# Patient Record
Sex: MALE | Employment: FULL TIME | URBAN - METROPOLITAN AREA
[De-identification: names, ages, dates, MRNs, and addresses within clinical notes are randomized per-mention and may not be internally consistent; named-entity substitution may affect disease eponyms.]

---

## 2024-02-02 NOTE — PROGRESS NOTES
[facility]  [address]   [address]  [place] [address]  [phone]    Keith Andres  : 1995    CC: Discharge in urine referral          HPI     Keith Andres is a 28 y.o.  male who was referred for urethral discharge in for a year with intermittent pelvic pain for 3 years. Per PCP it is reported the urine is cloudy and when he finishes the stream he has a pinching sensation in his pelvic floor followed by the release of \"stringy white discharge\". Longest without symptoms is 3 days.     Today he reports the above symptoms that come/go about every 3 days.  Sexual intercourse / ejaculation seem to help symptoms while abstinence makes them worse.  Does have urinary hesitancy, slow stream, urgency, frequency.  States pain is primarily in perineum / pelvic floor.  Denies hematuria, hematospermia, dysuria, UTIs, erectile dysfunction.  No history of  procedures.  No history of  trauma.  No history of STIs.  Reports he has seen multiple urologists for this issue and diagnosed with chronic prostatitis.  He has never had a cystoscopy and has never been on medication for chronic prostatitis.  He has not had any pelvic imaging. He was told to \"ejaculate frequently\" to relieve symptoms.      ON review, he also describes L testicle pain with the urethral discharge episodes that also subsides.  Denies twisting of testicle.  Has not had any scrotal imaging.  Denies palpable mass in testicle.  No personal or FH of  cancer.  Does state that dad had similar prostate issues.    PVR 24 cc     UA was normal today.     Past Medical History:   Diagnosis Date    Hypertension        History reviewed. No pertinent surgical history.    Current Outpatient Medications   Medication Sig Dispense Refill    lisinopril (PRINIVIL;ZESTRIL) 10 MG tablet Take 1 tablet by mouth daily      omeprazole (PRILOSEC) 40 MG delayed release capsule Take 1 capsule by mouth daily       No current facility-administered

## 2024-02-05 ENCOUNTER — OFFICE VISIT (OUTPATIENT)
Dept: UROLOGY | Age: 29
End: 2024-02-05
Payer: COMMERCIAL

## 2024-02-05 DIAGNOSIS — R39.198 OTHER DIFFICULTIES WITH MICTURITION: ICD-10-CM

## 2024-02-05 DIAGNOSIS — R36.9 DISCHARGE FROM URETHRA IN MALE: Primary | ICD-10-CM

## 2024-02-05 DIAGNOSIS — N50.812 PAIN IN LEFT TESTICLE: ICD-10-CM

## 2024-02-05 LAB
BILIRUBIN, URINE, POC: NEGATIVE
BLOOD URINE, POC: NEGATIVE
GLUCOSE URINE, POC: NEGATIVE
KETONES, URINE, POC: NEGATIVE
LEUKOCYTE ESTERASE, URINE, POC: NEGATIVE
NITRITE, URINE, POC: NEGATIVE
PH, URINE, POC: 7.5 (ref 4.6–8)
PROTEIN,URINE, POC: NORMAL
PVR, POC: 24 CC
SPECIFIC GRAVITY, URINE, POC: 1.02 (ref 1–1.03)
URINALYSIS CLARITY, POC: NORMAL
URINALYSIS COLOR, POC: NORMAL
UROBILINOGEN, POC: NORMAL

## 2024-02-05 PROCEDURE — 81003 URINALYSIS AUTO W/O SCOPE: CPT | Performed by: UROLOGY

## 2024-02-05 PROCEDURE — 51798 US URINE CAPACITY MEASURE: CPT | Performed by: UROLOGY

## 2024-02-05 PROCEDURE — 99205 OFFICE O/P NEW HI 60 MIN: CPT | Performed by: UROLOGY

## 2024-02-05 RX ORDER — LISINOPRIL 10 MG/1
10 TABLET ORAL DAILY
COMMUNITY
Start: 2022-10-17 | End: 2024-09-12

## 2024-02-05 RX ORDER — OMEPRAZOLE 40 MG/1
40 CAPSULE, DELAYED RELEASE ORAL DAILY
COMMUNITY
Start: 2021-02-26

## 2024-02-06 ASSESSMENT — ENCOUNTER SYMPTOMS
EYE DISCHARGE: 0
SHORTNESS OF BREATH: 0
BLOOD IN STOOL: 0
SKIN LESIONS: 0
COUGH: 0
DIARRHEA: 0
INDIGESTION: 0
ABDOMINAL PAIN: 0
VOMITING: 0
HEARTBURN: 0
CONSTIPATION: 0
BACK PAIN: 0
EYE PAIN: 0
WHEEZING: 0
NAUSEA: 0

## 2024-02-12 ENCOUNTER — HOSPITAL ENCOUNTER (OUTPATIENT)
Dept: ULTRASOUND IMAGING | Age: 29
Discharge: HOME | End: 2024-02-15
Attending: UROLOGY

## 2024-02-12 DIAGNOSIS — N50.812 PAIN IN LEFT TESTICLE: ICD-10-CM

## 2024-02-13 NOTE — RESULT ENCOUNTER NOTE
Mr. Andres, your scrotal ultrasound does not show a mass or any overly concerning finding to explain your testicle pain.  It does show some small varicoceles / hydroceles and benign epididymal cysts.  These are usually nothing to worry about and I will review these with you at your upcoming appointment.     Please let me know if you have questions / concerns in the interim.     Best regards,  Dr. Morris

## 2024-03-08 ENCOUNTER — TELEPHONE (OUTPATIENT)
Dept: UROLOGY | Age: 29
End: 2024-03-08

## 2024-03-08 NOTE — TELEPHONE ENCOUNTER
Called [employer] to get update on the Pending PA (have to do in Portal) that had the decision of \"VOIDED\", representative stated that the insurance had ended on 02/29/24. I did go into pt chart where ins still says \"e-verified\" and updated it to be ineffective.     I called the pt - no answer, left vm to call in with new insurance.

## 2024-03-11 ENCOUNTER — PROCEDURE VISIT (OUTPATIENT)
Dept: UROLOGY | Age: 29
End: 2024-03-11

## 2024-03-11 DIAGNOSIS — N48.89 PENILE PAIN: ICD-10-CM

## 2024-03-11 DIAGNOSIS — N32.81 OVERACTIVE BLADDER: ICD-10-CM

## 2024-03-11 DIAGNOSIS — N41.1 CHRONIC PROSTATITIS: ICD-10-CM

## 2024-03-11 DIAGNOSIS — R36.9 DISCHARGE FROM URETHRA IN MALE: Primary | ICD-10-CM

## 2024-03-11 LAB
BILIRUBIN, URINE, POC: NORMAL
BLOOD URINE, POC: NEGATIVE
GLUCOSE URINE, POC: NEGATIVE
KETONES, URINE, POC: NEGATIVE
LEUKOCYTE ESTERASE, URINE, POC: NEGATIVE
NITRITE, URINE, POC: NEGATIVE
PH, URINE, POC: 6 (ref 4.6–8)
PROTEIN,URINE, POC: NEGATIVE
SPECIFIC GRAVITY, URINE, POC: 1.02 (ref 1–1.03)
URINALYSIS CLARITY, POC: NORMAL
URINALYSIS COLOR, POC: NORMAL
UROBILINOGEN, POC: NORMAL

## 2024-03-11 PROCEDURE — 99214 OFFICE O/P EST MOD 30 MIN: CPT | Performed by: UROLOGY

## 2024-03-11 PROCEDURE — 81003 URINALYSIS AUTO W/O SCOPE: CPT | Performed by: UROLOGY

## 2024-03-11 PROCEDURE — 52000 CYSTOURETHROSCOPY: CPT | Performed by: UROLOGY

## 2024-03-11 NOTE — PROGRESS NOTES
[facility]  [address]   [address]  [place] [address]  [phone]    Keith Andres  : 1995    HPI   28 y.o.  male who presents for cystoscopy for evaluation of urethral pain.     Referred for urethral discharge in for a year with intermittent pelvic pain for 3 years. Per PCP it is reported the urine is cloudy and when he finishes the stream he has a pinching sensation in his pelvic floor followed by the release of \"stringy white discharge\". Longest without symptoms is 3 days.      Today he reports the above symptoms that come/go about every 3 days.  Sexual intercourse / ejaculation seem to help symptoms while abstinence makes them worse.  Does have urinary hesitancy, slow stream, urgency, frequency.  States pain is primarily in perineum / pelvic floor.  Denies hematuria, hematospermia, dysuria, UTIs, erectile dysfunction.  No history of  procedures.  No history of  trauma.  No history of STIs.  Reports he has seen multiple urologists for this issue and diagnosed with chronic prostatitis.  He has never had a cystoscopy and has never been on medication for chronic prostatitis.  He has not had any pelvic imaging. He was told to \"ejaculate frequently\" to relieve symptoms.  He tried PFRT for these issues in the past as well without success.     ON review, he also describes L testicle pain with the urethral discharge episodes that also subsides.  Denies twisting of testicle.  Has not had any scrotal imaging.  Denies palpable mass in testicle.  No personal or FH of  cancer.  Does state that dad had similar prostate issues.    Scrotal US 2024 showed small bilateral varicocele, hydroceles and benign epididymal cysts. Imaging reviewed by me today.      PVR 24 cc      UA was normal today.       Past Medical History:   Diagnosis Date    Hypertension      History reviewed. No pertinent surgical history.  Current Outpatient Medications   Medication Sig Dispense Refill

## 2024-04-05 ENCOUNTER — TELEPHONE (OUTPATIENT)
Dept: UROLOGY | Age: 29
End: 2024-04-05

## 2024-04-05 DIAGNOSIS — N41.1 CHRONIC PROSTATITIS: Primary | ICD-10-CM

## 2024-04-05 RX ORDER — DOXYCYCLINE HYCLATE 100 MG
100 TABLET ORAL 2 TIMES DAILY
Qty: 84 TABLET | Refills: 0 | Status: SHIPPED | OUTPATIENT
Start: 2024-04-05 | End: 2024-05-17

## 2024-04-05 NOTE — TELEPHONE ENCOUNTER
Patient called requesting prolonged course of antibiotics for prostatitis treatment.  Gemtesa did not help his urinary symptoms.     6 weeks doxycycline called into his pharmacy.  He will try this and let me know if no improvement.     To Morris M.D.    [facility]  [address] [address]  Phone: [phone]  Fax: [phone]

## 2024-05-08 ENCOUNTER — TELEPHONE (OUTPATIENT)
Dept: UROLOGY | Age: 29
End: 2024-05-08

## 2024-05-08 NOTE — TELEPHONE ENCOUNTER
I received an email to refile DOS 3/11/2024 with [facility]. ID number [id number] and group number [id number].     DOS 3/11/2024 has been refiled.